# Patient Record
(demographics unavailable — no encounter records)

---

## 2025-05-28 NOTE — HISTORY OF PRESENT ILLNESS
[FreeTextEntry1] : Patient is a male with history of encephalitis at age 13, presenting for medication management. Patient recently relocated from [place] and is now living with his sister-in-law following his wife's passing. 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 Patient has been maintained on clonazepam (0.5mg BID and 2mg qHS) for approximately 50 years, previously managed by a neurologist in [place] with twice-yearly follow-ups. Sister-in-law reports no recent seizures, though patient continues to experience chronic tremors and involuntary movements of the right arm and head. These symptoms have remained stable with no recent changes. Patient was previously on Primidone which was tapered off by his previous neurologist. Current medications include clonazepam, montelukast, vitamin supplements, magnesium, and levothyroxine (though thyroid condition unconfirmed). Patient has history of eye surgery bilaterally and currently has poor vision. Sister-in-law reports they closely supervise patient's mobility due to fall risk.

## 2025-05-28 NOTE — ASSESSMENT
[FreeTextEntry1] : 1. 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 Fall Risk: - Discussed increased fall risk associated with benzodiazepine use in elderly - Encouraged continued supervision and safety measures  3. Follow-up: - Schedule follow-up in 6 months - Prescriptions provided for 6-month supply - Patient/family to call for any acute changes or concerns